# Patient Record
Sex: MALE | Race: WHITE
[De-identification: names, ages, dates, MRNs, and addresses within clinical notes are randomized per-mention and may not be internally consistent; named-entity substitution may affect disease eponyms.]

---

## 2021-09-07 PROBLEM — Z00.00 ENCOUNTER FOR PREVENTIVE HEALTH EXAMINATION: Status: ACTIVE | Noted: 2021-09-07

## 2021-09-09 ENCOUNTER — APPOINTMENT (OUTPATIENT)
Dept: VASCULAR SURGERY | Facility: CLINIC | Age: 71
End: 2021-09-09
Payer: MEDICARE

## 2021-09-09 VITALS
SYSTOLIC BLOOD PRESSURE: 158 MMHG | HEIGHT: 68 IN | WEIGHT: 180 LBS | DIASTOLIC BLOOD PRESSURE: 102 MMHG | HEART RATE: 97 BPM | BODY MASS INDEX: 27.28 KG/M2

## 2021-09-09 DIAGNOSIS — I83.93 ASYMPTOMATIC VARICOSE VEINS OF BILATERAL LOWER EXTREMITIES: ICD-10-CM

## 2021-09-09 DIAGNOSIS — Z78.9 OTHER SPECIFIED HEALTH STATUS: ICD-10-CM

## 2021-09-09 DIAGNOSIS — Z86.79 PERSONAL HISTORY OF OTHER DISEASES OF THE CIRCULATORY SYSTEM: ICD-10-CM

## 2021-09-09 DIAGNOSIS — I80.01 PHLEBITIS AND THROMBOPHLEBITIS OF SUPERFICIAL VESSELS OF RIGHT LOWER EXTREMITY: ICD-10-CM

## 2021-09-09 DIAGNOSIS — F17.200 NICOTINE DEPENDENCE, UNSPECIFIED, UNCOMPLICATED: ICD-10-CM

## 2021-09-09 DIAGNOSIS — I78.1 NEVUS, NON-NEOPLASTIC: ICD-10-CM

## 2021-09-09 DIAGNOSIS — R60.0 LOCALIZED EDEMA: ICD-10-CM

## 2021-09-09 PROCEDURE — 93970 EXTREMITY STUDY: CPT

## 2021-09-09 PROCEDURE — 99203 OFFICE O/P NEW LOW 30 MIN: CPT

## 2021-09-09 RX ORDER — AMLODIPINE BESYLATE 10 MG/1
10 TABLET ORAL
Refills: 0 | Status: ACTIVE | COMMUNITY

## 2021-09-09 RX ORDER — LOSARTAN POTASSIUM 100 MG/1
TABLET, FILM COATED ORAL
Refills: 0 | Status: ACTIVE | COMMUNITY

## 2021-10-04 NOTE — HISTORY OF PRESENT ILLNESS
[FreeTextEntry1] : 70 y/o M w/ PMH of HTN and active smoker. He is referred by Dr Handy. Pt reports a lnog standing history of bilateral LEs varicose veins. Denies any localized pain over them, leg heaviness, significant swelling or pervious vein treatments. He explains his mother suffered from varicose veins too. Around 2 weeks ago, he developed itchiness, discomfort and some redness on the right leg medially below the knee and mid calf. He was abroad in [place] and reports being more sedentary than usual. He called Dr Handy and he was prescribed Lovenox 60mg BID for 2 days. He returned [place] the [place] and was referred to see us with concerns of a blood clot. Pt denies any previous hx of bleeding or clotting disorders (personal or family), use of compression stockings, anticoagulants or wounds. He reports the area of concern is almost back to normal. He will be traveling back to [place] this Saturday and is wondering if he should take Lovenox again.\par \par Pt lives between the [place] and [place].

## 2021-10-04 NOTE — ASSESSMENT
[Arterial/Venous Disease] : arterial/venous disease [Medication Management] : medication management [FreeTextEntry1] : 70 y/o F w/ chronic bilateral lower extremity varicose veins with evidence of R subacute SVT and chronic changes in R GSV, VV and LLE VVs. On exam, scattered, large bulging vv throughout both legs and spider veins. RLE medially, below the knee and mid calf level with mildly indurated superficial varicose veins, no redness or tenderness. Venous duplex completed in the office and no evidence of DVT. + R SVT at the knee level. Pt advised to stay active, well hydrated and use compression stockings daily (prescription provided). Also, calf pump exercises reviewed to complete during his flights. He should take Lovenox 60mg x2 doses, 12 hrs apart around the time of his flights. This episode seems to have been exacerbated by his traveling plus decrease in activity level. Pt may followup PRN.

## 2021-10-04 NOTE — PHYSICAL EXAM
[Respiratory Effort] : normal respiratory effort [Normal Rate and Rhythm] : normal rate and rhythm [2+] : left 2+ [Ankle Swelling (On Exam)] : present [Ankle Swelling Bilaterally] : bilaterally  [Ankle Swelling On The Right] : mild [Ankle Swelling On The Left] : moderate [Varicose Veins Of Lower Extremities] : bilaterally [No Rash or Lesion] : No rash or lesion [Alert] : alert [Oriented to Person] : oriented to person [Oriented to Place] : oriented to place [Oriented to Time] : oriented to time [Calm] : calm [] : not present [Abdomen Tenderness] : ~T ~M No abdominal tenderness [de-identified] : friendly,  [de-identified] : FROM [FreeTextEntry1] : BLEs with scattered large, bulging varicose veins and spider veins. RLE medially, below the knee and mid calf level with mildly indurated superficial varicose veins, no redness or tenderness.

## 2021-10-04 NOTE — PROCEDURE
[FreeTextEntry1] : VEnous duplex BLEs ordered today shows: negative DVT. BLE GSV reflux. R GSV with chronic changes throughout and in varicose veins. RLE + SVT at the knee level. L mild focal chronic changes in proximal calf varicose veins.

## 2021-10-04 NOTE — ADDENDUM
[FreeTextEntry1] : I, Dr. Terrell Dawn, personally performed the evaluation and management (E/M) services for this new patient.  That E/M includes conducting the initial examination, assessing all conditions, and establishing the plan of care.  Today, my ACP, Natalya Montoya NP, was here to observe my evaluation and management services for this patient to be followed going forward.